# Patient Record
(demographics unavailable — no encounter records)

---

## 2025-02-25 NOTE — HISTORY OF PRESENT ILLNESS
[FreeTextEntry1] :    LMP 25    x3  Pelvic pain - MRI diagnosis for embolize  heavy menses that are pain, denies history of transfusion